# Patient Record
Sex: MALE | Race: WHITE
[De-identification: names, ages, dates, MRNs, and addresses within clinical notes are randomized per-mention and may not be internally consistent; named-entity substitution may affect disease eponyms.]

---

## 2021-06-10 ENCOUNTER — HOSPITAL ENCOUNTER (INPATIENT)
Dept: HOSPITAL 54 - ER | Age: 46
LOS: 4 days | Discharge: NURSING, REHAB OR LONG-TERM CARE | DRG: 871 | End: 2021-06-14
Attending: NURSE PRACTITIONER | Admitting: NURSE PRACTITIONER
Payer: MEDICARE

## 2021-06-10 VITALS — HEIGHT: 70 IN | BODY MASS INDEX: 45.1 KG/M2 | WEIGHT: 315 LBS

## 2021-06-10 VITALS — SYSTOLIC BLOOD PRESSURE: 94 MMHG | DIASTOLIC BLOOD PRESSURE: 51 MMHG

## 2021-06-10 VITALS — SYSTOLIC BLOOD PRESSURE: 104 MMHG | DIASTOLIC BLOOD PRESSURE: 50 MMHG

## 2021-06-10 DIAGNOSIS — S81.811A: ICD-10-CM

## 2021-06-10 DIAGNOSIS — R65.20: ICD-10-CM

## 2021-06-10 DIAGNOSIS — N17.0: ICD-10-CM
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
## 2021-06-10 DIAGNOSIS — I10: ICD-10-CM
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
## 2021-06-10 DIAGNOSIS — A41.9: Primary | ICD-10-CM

## 2021-06-10 DIAGNOSIS — F41.9: ICD-10-CM

## 2021-06-10 DIAGNOSIS — L03.115: ICD-10-CM

## 2021-06-10 DIAGNOSIS — E87.1: ICD-10-CM

## 2021-06-10 DIAGNOSIS — F32.9: ICD-10-CM

## 2021-06-10 DIAGNOSIS — D64.9: ICD-10-CM

## 2021-06-10 DIAGNOSIS — J98.11: ICD-10-CM

## 2021-06-10 DIAGNOSIS — Z20.822: ICD-10-CM

## 2021-06-10 DIAGNOSIS — E87.6: ICD-10-CM

## 2021-06-10 DIAGNOSIS — F20.9: ICD-10-CM

## 2021-06-10 DIAGNOSIS — E66.01: ICD-10-CM

## 2021-06-10 DIAGNOSIS — Z79.899: ICD-10-CM

## 2021-06-10 DIAGNOSIS — G89.29: ICD-10-CM

## 2021-06-10 DIAGNOSIS — E86.1: ICD-10-CM

## 2021-06-10 DIAGNOSIS — E16.2: ICD-10-CM

## 2021-06-10 LAB
BASOPHILS # BLD AUTO: 0.1 /CMM (ref 0–0.2)
BASOPHILS NFR BLD AUTO: 0.4 % (ref 0–2)
BILIRUB DIRECT SERPL-MCNC: 0.2 MG/DL (ref 0–0.2)
BILIRUB SERPL-MCNC: 0.7 MG/DL (ref 0.2–1)
BUN SERPL-MCNC: 12 MG/DL (ref 7–18)
CALCIUM SERPL-MCNC: 9 MG/DL (ref 8.5–10.1)
CHLORIDE SERPL-SCNC: 99 MMOL/L (ref 98–107)
CO2 SERPL-SCNC: 24 MMOL/L (ref 21–32)
COLOR UR: YELLOW
CREAT SERPL-MCNC: 1.2 MG/DL (ref 0.6–1.3)
EOSINOPHIL NFR BLD AUTO: 0 % (ref 0–6)
GLUCOSE SERPL-MCNC: 118 MG/DL (ref 74–106)
HCT VFR BLD AUTO: 47 % (ref 39–51)
HGB BLD-MCNC: 15.2 G/DL (ref 13.5–17.5)
LYMPHOCYTES NFR BLD AUTO: 0.7 /CMM (ref 0.8–4.8)
LYMPHOCYTES NFR BLD AUTO: 4.4 % (ref 20–44)
MCHC RBC AUTO-ENTMCNC: 32 G/DL (ref 31–36)
MCV RBC AUTO: 91 FL (ref 80–96)
MONOCYTES NFR BLD AUTO: 0.5 /CMM (ref 0.1–1.3)
MONOCYTES NFR BLD AUTO: 2.9 % (ref 2–12)
NEUTROPHILS # BLD AUTO: 15.4 /CMM (ref 1.8–8.9)
NEUTROPHILS NFR BLD AUTO: 92.3 % (ref 43–81)
PH UR STRIP: 7.5 [PH] (ref 5–8)
PLATELET # BLD AUTO: 224 /CMM (ref 150–450)
POTASSIUM SERPL-SCNC: 3.8 MMOL/L (ref 3.5–5.1)
RBC # BLD AUTO: 5.16 MIL/UL (ref 4.5–6)
SODIUM SERPL-SCNC: 134 MMOL/L (ref 136–145)
UROBILINOGEN UR STRIP-MCNC: 0.2 EU/DL
WBC NRBC COR # BLD AUTO: 16.7 K/UL (ref 4.3–11)

## 2021-06-10 PROCEDURE — U0003 INFECTIOUS AGENT DETECTION BY NUCLEIC ACID (DNA OR RNA); SEVERE ACUTE RESPIRATORY SYNDROME CORONAVIRUS 2 (SARS-COV-2) (CORONAVIRUS DISEASE [COVID-19]), AMPLIFIED PROBE TECHNIQUE, MAKING USE OF HIGH THROUGHPUT TECHNOLOGIES AS DESCRIBED BY CMS-2020-01-R: HCPCS

## 2021-06-10 PROCEDURE — G0378 HOSPITAL OBSERVATION PER HR: HCPCS

## 2021-06-10 RX ADMIN — BENZTROPINE MESYLATE SCH MG: 1 TABLET ORAL at 19:12

## 2021-06-10 RX ADMIN — DOCUSATE SODIUM SCH MG: 250 CAPSULE, LIQUID FILLED ORAL at 19:13

## 2021-06-10 RX ADMIN — ENOXAPARIN SODIUM SCH MG: 40 INJECTION SUBCUTANEOUS at 19:14

## 2021-06-10 RX ADMIN — ACETAMINOPHEN PRN MG: 325 TABLET ORAL at 21:02

## 2021-06-10 RX ADMIN — PIPERACILLIN SODIUM AND TAZOBACTAM SODIUM SCH MLS/HR: .375; 3 INJECTION, POWDER, LYOPHILIZED, FOR SOLUTION INTRAVENOUS at 21:22

## 2021-06-10 RX ADMIN — TOPIRAMATE SCH MG: 100 TABLET, COATED ORAL at 19:13

## 2021-06-10 RX ADMIN — SODIUM CHLORIDE PRN MLS/HR: 9 INJECTION, SOLUTION INTRAVENOUS at 20:39

## 2021-06-10 NOTE — NUR
MARY FROM [facility] TO ER BED 6. AAOX4. NOT IN RESP DISTRESS. BROUGHT IN 
FOR FEVER .1. PT VERBALIZED THAT HE DIDNT FELL WELL AND FELT WEID WHEN HE 
WAS IN HIS THERAPY SESSION. PT IS NOTED WITH ORAL TEMP .1. MD WAS AT THE 
BEDSIDE FOR EVAL. ORDERS RECEIVED, NOTED AND CARRIED OUT. IV LINE ESTABLISHED 
ON L AC 20G. BLOOD DRAWN AND GIVEN TO PHLEB.

## 2021-06-10 NOTE — NUR
pt transported to unit on [facility] with emt and rn at bedside w/ acls protocol. 
na dnoted during transport. pt ambulated from gurney to bed on steady gait

## 2021-06-10 NOTE — NUR
ms rn note 

 received patient from er with dx rt leg cellulitis under care Moshe guzman rn np , 
patient alert , oriented x4 , on ra no sob noted at this time,vs taken  , lt ac hl intact 
and flushed well , infusion vanco from er at this time,  bed in lowest and locked position , 
body check done ,call light within reach belonging checked by cna , plan of care discussed 
with patient,drown level for lactic acid by phlebotomist, will cont to monitor closely

## 2021-06-11 VITALS — DIASTOLIC BLOOD PRESSURE: 70 MMHG | SYSTOLIC BLOOD PRESSURE: 114 MMHG

## 2021-06-11 VITALS — SYSTOLIC BLOOD PRESSURE: 132 MMHG | DIASTOLIC BLOOD PRESSURE: 81 MMHG

## 2021-06-11 VITALS — DIASTOLIC BLOOD PRESSURE: 80 MMHG | SYSTOLIC BLOOD PRESSURE: 134 MMHG

## 2021-06-11 VITALS — DIASTOLIC BLOOD PRESSURE: 48 MMHG | SYSTOLIC BLOOD PRESSURE: 149 MMHG

## 2021-06-11 LAB
ALBUMIN SERPL BCP-MCNC: 3.3 G/DL (ref 3.4–5)
ALP SERPL-CCNC: 69 U/L (ref 46–116)
ALT SERPL W P-5'-P-CCNC: 45 U/L (ref 12–78)
AST SERPL W P-5'-P-CCNC: 78 U/L (ref 15–37)
BASOPHILS # BLD AUTO: 0 /CMM (ref 0–0.2)
BASOPHILS NFR BLD AUTO: 0.2 % (ref 0–2)
BILIRUB DIRECT SERPL-MCNC: 0.3 MG/DL (ref 0–0.2)
BILIRUB SERPL-MCNC: 1 MG/DL (ref 0.2–1)
BUN SERPL-MCNC: 15 MG/DL (ref 7–18)
CALCIUM SERPL-MCNC: 8.2 MG/DL (ref 8.5–10.1)
CHLORIDE SERPL-SCNC: 103 MMOL/L (ref 98–107)
CHOLEST SERPL-MCNC: 123 MG/DL (ref ?–200)
CO2 SERPL-SCNC: 25 MMOL/L (ref 21–32)
CREAT SERPL-MCNC: 1.5 MG/DL (ref 0.6–1.3)
EOSINOPHIL NFR BLD AUTO: 0 % (ref 0–6)
GLUCOSE SERPL-MCNC: 110 MG/DL (ref 74–106)
HCT VFR BLD AUTO: 41 % (ref 39–51)
HDLC SERPL-MCNC: 45 MG/DL (ref 40–60)
HGB BLD-MCNC: 13.4 G/DL (ref 13.5–17.5)
LDLC SERPL DIRECT ASSAY-MCNC: 66 MG/DL (ref 0–99)
LIPASE SERPL-CCNC: 110 U/L (ref 73–393)
LYMPHOCYTES NFR BLD AUTO: 0.7 /CMM (ref 0.8–4.8)
LYMPHOCYTES NFR BLD AUTO: 4.2 % (ref 20–44)
MAGNESIUM SERPL-MCNC: 2 MG/DL (ref 1.8–2.4)
MCHC RBC AUTO-ENTMCNC: 33 G/DL (ref 31–36)
MCV RBC AUTO: 91 FL (ref 80–96)
MONOCYTES NFR BLD AUTO: 0.6 /CMM (ref 0.1–1.3)
MONOCYTES NFR BLD AUTO: 3.3 % (ref 2–12)
NEUTROPHILS # BLD AUTO: 15.6 /CMM (ref 1.8–8.9)
NEUTROPHILS NFR BLD AUTO: 92.3 % (ref 43–81)
PHOSPHATE SERPL-MCNC: 2.8 MG/DL (ref 2.5–4.9)
PLATELET # BLD AUTO: 186 /CMM (ref 150–450)
POTASSIUM SERPL-SCNC: 3.4 MMOL/L (ref 3.5–5.1)
PROT SERPL-MCNC: 7 G/DL (ref 6.4–8.2)
RBC # BLD AUTO: 4.5 MIL/UL (ref 4.5–6)
SODIUM SERPL-SCNC: 138 MMOL/L (ref 136–145)
TRIGL SERPL-MCNC: 46 MG/DL (ref 30–150)
TSH SERPL DL<=0.005 MIU/L-ACNC: 0.52 UIU/ML (ref 0.36–3.74)
WBC NRBC COR # BLD AUTO: 16.9 K/UL (ref 4.3–11)

## 2021-06-11 RX ADMIN — BENZTROPINE MESYLATE SCH MG: 1 TABLET ORAL at 08:33

## 2021-06-11 RX ADMIN — CLOTRIMAZOLE SCH GM: 1 CREAM TOPICAL at 17:23

## 2021-06-11 RX ADMIN — SODIUM CHLORIDE PRN MLS/HR: 9 INJECTION, SOLUTION INTRAVENOUS at 08:18

## 2021-06-11 RX ADMIN — ENOXAPARIN SODIUM SCH MG: 40 INJECTION SUBCUTANEOUS at 21:56

## 2021-06-11 RX ADMIN — DEXTROSE MONOHYDRATE SCH MLS/HR: 50 INJECTION, SOLUTION INTRAVENOUS at 08:19

## 2021-06-11 RX ADMIN — PIPERACILLIN SODIUM AND TAZOBACTAM SODIUM SCH MLS/HR: .375; 3 INJECTION, POWDER, LYOPHILIZED, FOR SOLUTION INTRAVENOUS at 04:06

## 2021-06-11 RX ADMIN — ACETAMINOPHEN PRN MG: 325 TABLET ORAL at 08:33

## 2021-06-11 RX ADMIN — DOCUSATE SODIUM SCH MG: 250 CAPSULE, LIQUID FILLED ORAL at 17:19

## 2021-06-11 RX ADMIN — ARIPIPRAZOLE SCH MG: 5 TABLET ORAL at 08:33

## 2021-06-11 RX ADMIN — MEROPENEM SCH MLS/HR: 1 INJECTION INTRAVENOUS at 21:54

## 2021-06-11 RX ADMIN — MEROPENEM SCH MLS/HR: 1 INJECTION INTRAVENOUS at 11:51

## 2021-06-11 RX ADMIN — TOPIRAMATE SCH MG: 100 TABLET, COATED ORAL at 17:19

## 2021-06-11 RX ADMIN — TOPIRAMATE SCH MG: 100 TABLET, COATED ORAL at 08:34

## 2021-06-11 RX ADMIN — DEXTROSE MONOHYDRATE SCH MLS/HR: 50 INJECTION, SOLUTION INTRAVENOUS at 20:23

## 2021-06-11 RX ADMIN — BENZTROPINE MESYLATE SCH MG: 1 TABLET ORAL at 17:19

## 2021-06-11 RX ADMIN — CLOTRIMAZOLE SCH GM: 1 CREAM TOPICAL at 09:41

## 2021-06-11 RX ADMIN — LISINOPRIL SCH MG: 10 TABLET ORAL at 08:34

## 2021-06-11 NOTE — NUR
MS/RN OPENING  NOTES

PATIENT IS ON BED AWAKE ALERT AND ORIENTED X3. PATIENT IS ON ROOM AIR SATURATION 97%. 
PATIENT IN NO APPARENT RESPIRATORY DISTRESS NOTED. NO COMPLAINED OF PAIN AT THIS TIME. IV 
ACCESS AT LEFT HAND # 20 G WITH IV FLUID OF NS1L AT 75ML/HR ON AND INFUSING WELL. SEEN AND 
EXAMINED BY MD WITH ORDERS MADE AND CARRIED OUT. ALL DUE MEDICATIONS WAS GIVEN. SAFETY 
PRECAUTIONS WAS IN PLACED. BED IN LOWEST POSITION AND LOCKED. SIDERAILS UP X2. CALL LIGHT 
WITHIN REACH. WILL ENDORSED TO NIGHT SHIFT FOR TIM.

## 2021-06-11 NOTE — NUR
WOUND CARE CONSULT: REVIEWED CHART, NURSING DOCUMENTATION AND PHOTOS WHICH INDICATE 
REDNESS/RASH TO SKIN FOLDS AND DRY WOUND/SCAB TO RT LOWER LEG WITH REDNESS, PRESENT ON 
ADMISSION. RECOMMEND DPM CONSULT. DR HICKS NOTIFIED OF CONSULT REQUEST. RECOMMENDATIONS 
MADE FOR SKIN PROTECTION. DISCUSSED WITH NURSING STAFF. MD IN AGREEMENT WITH PLAN OF CARE. 
CURRENT KVNG SCORE IS 14.

## 2021-06-11 NOTE — NUR
MS/RN OPENING  NOTES

RECEIVED PATIENT IN AWAKE ALERT AND ORIENTED X3. PATIENT IS ON ROOM AIR SATURATING WELL. NO 
COMPLAINED OF PAIN AT THIS TIME. WILL CONTINUE TO MONITOR.

## 2021-06-11 NOTE — NUR
RN notes



In bed. resting comfortably with no distress noted. Breathing even and unlabored. On room 
air tolerating well. Alert and oriented, able to verbally communicate needs. No complaint of 
pain or discomfort. Noted with elevated temp of 100.4. Tylenol given. Temp went down to 
99.2. Kept clean and dry. Will endorse to next shift for continuity of care.

## 2021-06-12 VITALS — DIASTOLIC BLOOD PRESSURE: 75 MMHG | SYSTOLIC BLOOD PRESSURE: 131 MMHG

## 2021-06-12 VITALS — DIASTOLIC BLOOD PRESSURE: 76 MMHG | SYSTOLIC BLOOD PRESSURE: 123 MMHG

## 2021-06-12 LAB
BASOPHILS # BLD AUTO: 0 /CMM (ref 0–0.2)
BASOPHILS NFR BLD AUTO: 0.4 % (ref 0–2)
BUN SERPL-MCNC: 14 MG/DL (ref 7–18)
CALCIUM SERPL-MCNC: 8.2 MG/DL (ref 8.5–10.1)
CHLORIDE SERPL-SCNC: 106 MMOL/L (ref 98–107)
CO2 SERPL-SCNC: 20 MMOL/L (ref 21–32)
CREAT SERPL-MCNC: 1.1 MG/DL (ref 0.6–1.3)
EOSINOPHIL NFR BLD AUTO: 0.2 % (ref 0–6)
GLUCOSE SERPL-MCNC: 92 MG/DL (ref 74–106)
HCT VFR BLD AUTO: 38 % (ref 39–51)
HGB BLD-MCNC: 12.7 G/DL (ref 13.5–17.5)
LYMPHOCYTES NFR BLD AUTO: 1.2 /CMM (ref 0.8–4.8)
LYMPHOCYTES NFR BLD AUTO: 11.7 % (ref 20–44)
MCHC RBC AUTO-ENTMCNC: 33 G/DL (ref 31–36)
MCV RBC AUTO: 90 FL (ref 80–96)
MONOCYTES NFR BLD AUTO: 0.6 /CMM (ref 0.1–1.3)
MONOCYTES NFR BLD AUTO: 6 % (ref 2–12)
NEUTROPHILS # BLD AUTO: 8.5 /CMM (ref 1.8–8.9)
NEUTROPHILS NFR BLD AUTO: 81.7 % (ref 43–81)
PLATELET # BLD AUTO: 175 /CMM (ref 150–450)
POTASSIUM SERPL-SCNC: 3.6 MMOL/L (ref 3.5–5.1)
RBC # BLD AUTO: 4.25 MIL/UL (ref 4.5–6)
SODIUM SERPL-SCNC: 137 MMOL/L (ref 136–145)
WBC NRBC COR # BLD AUTO: 10.4 K/UL (ref 4.3–11)

## 2021-06-12 RX ADMIN — CLOTRIMAZOLE SCH GM: 1 CREAM TOPICAL at 09:03

## 2021-06-12 RX ADMIN — SODIUM CHLORIDE PRN MLS/HR: 9 INJECTION, SOLUTION INTRAVENOUS at 05:09

## 2021-06-12 RX ADMIN — DOCUSATE SODIUM SCH MG: 250 CAPSULE, LIQUID FILLED ORAL at 17:05

## 2021-06-12 RX ADMIN — TOPIRAMATE SCH MG: 100 TABLET, COATED ORAL at 09:03

## 2021-06-12 RX ADMIN — BENZTROPINE MESYLATE SCH MG: 1 TABLET ORAL at 17:06

## 2021-06-12 RX ADMIN — DEXTROSE MONOHYDRATE SCH MLS/HR: 50 INJECTION, SOLUTION INTRAVENOUS at 20:11

## 2021-06-12 RX ADMIN — BENZTROPINE MESYLATE SCH MG: 1 TABLET ORAL at 09:03

## 2021-06-12 RX ADMIN — MEROPENEM SCH MLS/HR: 1 INJECTION INTRAVENOUS at 12:14

## 2021-06-12 RX ADMIN — ARIPIPRAZOLE SCH MG: 5 TABLET ORAL at 10:09

## 2021-06-12 RX ADMIN — TOPIRAMATE SCH MG: 100 TABLET, COATED ORAL at 17:06

## 2021-06-12 RX ADMIN — DEXTROSE MONOHYDRATE SCH MLS/HR: 50 INJECTION, SOLUTION INTRAVENOUS at 09:03

## 2021-06-12 RX ADMIN — LISINOPRIL SCH MG: 10 TABLET ORAL at 09:03

## 2021-06-12 RX ADMIN — MEROPENEM SCH MLS/HR: 1 INJECTION INTRAVENOUS at 05:04

## 2021-06-12 RX ADMIN — CLOTRIMAZOLE SCH GM: 1 CREAM TOPICAL at 17:06

## 2021-06-12 RX ADMIN — ENOXAPARIN SODIUM SCH MG: 40 INJECTION SUBCUTANEOUS at 21:00

## 2021-06-12 RX ADMIN — MEROPENEM SCH MLS/HR: 1 INJECTION INTRAVENOUS at 22:08

## 2021-06-12 NOTE — NUR
RN notes



No distress noted, in bed. resting comfortably. Breathing even and unlabored. On room air 
tolerating well. Alert and oriented, able to verbally communicate needs. No complaint of 
pain or discomfort. Noted with elevated temp of 102.8. Tylenol given. Temp went down to 
99.9. Kept clean and dry. Will endorse to next shift for continuity of care.

## 2021-06-12 NOTE — NUR
MSRN OPENING notes



PT RECIEVED IN BED No distress noted,  resting comfortably. Breathing even and unlabored. On 
room air tolerating well. Alert and oriented, able to verbally communicate needs. No 
complaint of pain or discomfort. Had fever of 102.8 at night , tylenol given at night , 
recent temp was 99.8 Kept clean and dry. CALL LIGHT WITHIN EASY REACH AND ANSWERED PROPMTLY.

## 2021-06-12 NOTE — NUR
SRN CLOSING notes



PT RECIEVED IN BED No distress noted,  resting comfortably. Breathing even and unlabored. On 
room air tolerating well. Alert and oriented, able to verbally communicate needs. No 
complaint of pain or discomfort.  Kept clean and dry. CALL LIGHT WITHIN EASY REACH AND 
ANSWERED PROPMTLY.

## 2021-06-13 VITALS — DIASTOLIC BLOOD PRESSURE: 74 MMHG | SYSTOLIC BLOOD PRESSURE: 149 MMHG

## 2021-06-13 VITALS — SYSTOLIC BLOOD PRESSURE: 103 MMHG | DIASTOLIC BLOOD PRESSURE: 54 MMHG

## 2021-06-13 VITALS — SYSTOLIC BLOOD PRESSURE: 103 MMHG | DIASTOLIC BLOOD PRESSURE: 60 MMHG

## 2021-06-13 LAB
BUN SERPL-MCNC: 13 MG/DL (ref 7–18)
CALCIUM SERPL-MCNC: 8.1 MG/DL (ref 8.5–10.1)
CHLORIDE SERPL-SCNC: 107 MMOL/L (ref 98–107)
CO2 SERPL-SCNC: 21 MMOL/L (ref 21–32)
CREAT SERPL-MCNC: 1.1 MG/DL (ref 0.6–1.3)
GLUCOSE SERPL-MCNC: 95 MG/DL (ref 74–106)
POTASSIUM SERPL-SCNC: 3.3 MMOL/L (ref 3.5–5.1)
SODIUM SERPL-SCNC: 138 MMOL/L (ref 136–145)

## 2021-06-13 RX ADMIN — CLOTRIMAZOLE SCH GM: 1 CREAM TOPICAL at 16:41

## 2021-06-13 RX ADMIN — BENZTROPINE MESYLATE SCH MG: 1 TABLET ORAL at 07:58

## 2021-06-13 RX ADMIN — DOCUSATE SODIUM SCH MG: 250 CAPSULE, LIQUID FILLED ORAL at 16:34

## 2021-06-13 RX ADMIN — TOPIRAMATE SCH MG: 100 TABLET, COATED ORAL at 07:58

## 2021-06-13 RX ADMIN — ENOXAPARIN SODIUM SCH MG: 40 INJECTION SUBCUTANEOUS at 20:57

## 2021-06-13 RX ADMIN — DEXTROSE MONOHYDRATE SCH MLS/HR: 50 INJECTION, SOLUTION INTRAVENOUS at 07:57

## 2021-06-13 RX ADMIN — LISINOPRIL SCH MG: 10 TABLET ORAL at 07:58

## 2021-06-13 RX ADMIN — BENZTROPINE MESYLATE SCH MG: 1 TABLET ORAL at 16:28

## 2021-06-13 RX ADMIN — ENOXAPARIN SODIUM SCH MG: 40 INJECTION SUBCUTANEOUS at 21:34

## 2021-06-13 RX ADMIN — DEXTROSE MONOHYDRATE SCH MLS/HR: 50 INJECTION, SOLUTION INTRAVENOUS at 20:04

## 2021-06-13 RX ADMIN — ARIPIPRAZOLE SCH MG: 5 TABLET ORAL at 07:57

## 2021-06-13 RX ADMIN — SODIUM CHLORIDE PRN MLS/HR: 9 INJECTION, SOLUTION INTRAVENOUS at 04:13

## 2021-06-13 RX ADMIN — MEROPENEM SCH MLS/HR: 1 INJECTION INTRAVENOUS at 04:12

## 2021-06-13 RX ADMIN — TOPIRAMATE SCH MG: 100 TABLET, COATED ORAL at 16:33

## 2021-06-13 RX ADMIN — CLOTRIMAZOLE SCH GM: 1 CREAM TOPICAL at 07:59

## 2021-06-13 RX ADMIN — MEROPENEM SCH MLS/HR: 1 INJECTION INTRAVENOUS at 20:58

## 2021-06-13 RX ADMIN — MEROPENEM SCH MLS/HR: 1 INJECTION INTRAVENOUS at 11:56

## 2021-06-13 NOTE — NUR
MS1 RN NOTES

OFFER TO GIVE DUE LOVENOX 40MG SQ BUT REFUSED.EXPLAINED RISK AND BENEFITS,STILL REFUSED

## 2021-06-13 NOTE — NUR
MS RN CLOSING NOTES: PATIENT RESTING IN BED, AWAKE, A/O X4. NO S/S OF DISTRESS NOTED. CALL 
LIGHT WITHIN REACH. BED ALARM ON. BED IN LOWEST AND LOCKED POSITION. NO COMPLAIN OF PAIN.

## 2021-06-13 NOTE — NUR
MS1 RN NOTES

RECEIVED LYING ON BED,A/O X 3,NO SOB,NOTED REDNESS WITH SWELLING ON RIGHT LOWER 
LEG,ENCOURAGED TO ELEVATE ON PILLOWS WHILE ON BED,AMBULATE  WITH WALKER.PRESENT IVF INFUSING 
WELL ON RIGHT HAND SALINE LOCK VIA IV PUMP.DENIES PAIN AT THE MOMENT.CALL LIGHT IN 
REACH,NEEDS ANTICIPATED.

## 2021-06-13 NOTE — NUR
alert, oriented, claimed he cant walk to the bathroom, urinal offered.  R lower leg with 
noticeable redness, swollen and warm to touch. 

K+ today 3.3, got replaced with 40meq po.

continues on 2 abx ivpb ( Meropenem, and Vanco).  No complaint of pain on the leg

## 2021-06-14 VITALS — SYSTOLIC BLOOD PRESSURE: 120 MMHG | DIASTOLIC BLOOD PRESSURE: 72 MMHG

## 2021-06-14 VITALS — DIASTOLIC BLOOD PRESSURE: 74 MMHG | SYSTOLIC BLOOD PRESSURE: 120 MMHG

## 2021-06-14 LAB
ALBUMIN SERPL BCP-MCNC: 3.1 G/DL (ref 3.4–5)
ALP SERPL-CCNC: 57 U/L (ref 46–116)
ALT SERPL W P-5'-P-CCNC: 70 U/L (ref 12–78)
AST SERPL W P-5'-P-CCNC: 111 U/L (ref 15–37)
BASOPHILS # BLD AUTO: 0.1 /CMM (ref 0–0.2)
BASOPHILS NFR BLD AUTO: 0.9 % (ref 0–2)
BILIRUB SERPL-MCNC: 0.6 MG/DL (ref 0.2–1)
BUN SERPL-MCNC: 10 MG/DL (ref 7–18)
CALCIUM SERPL-MCNC: 8.3 MG/DL (ref 8.5–10.1)
CHLORIDE SERPL-SCNC: 109 MMOL/L (ref 98–107)
CO2 SERPL-SCNC: 19 MMOL/L (ref 21–32)
CREAT SERPL-MCNC: 0.9 MG/DL (ref 0.6–1.3)
EOSINOPHIL NFR BLD AUTO: 3.7 % (ref 0–6)
GLUCOSE SERPL-MCNC: 92 MG/DL (ref 74–106)
HCT VFR BLD AUTO: 39 % (ref 39–51)
HGB BLD-MCNC: 12.9 G/DL (ref 13.5–17.5)
LYMPHOCYTES NFR BLD AUTO: 1.4 /CMM (ref 0.8–4.8)
LYMPHOCYTES NFR BLD AUTO: 21 % (ref 20–44)
MAGNESIUM SERPL-MCNC: 2.2 MG/DL (ref 1.8–2.4)
MCHC RBC AUTO-ENTMCNC: 33 G/DL (ref 31–36)
MCV RBC AUTO: 90 FL (ref 80–96)
MONOCYTES NFR BLD AUTO: 0.5 /CMM (ref 0.1–1.3)
MONOCYTES NFR BLD AUTO: 8.2 % (ref 2–12)
NEUTROPHILS # BLD AUTO: 4.3 /CMM (ref 1.8–8.9)
NEUTROPHILS NFR BLD AUTO: 66.2 % (ref 43–81)
PHOSPHATE SERPL-MCNC: 2.3 MG/DL (ref 2.5–4.9)
PLATELET # BLD AUTO: 199 /CMM (ref 150–450)
POTASSIUM SERPL-SCNC: 3.7 MMOL/L (ref 3.5–5.1)
PROT SERPL-MCNC: 7.1 G/DL (ref 6.4–8.2)
RBC # BLD AUTO: 4.35 MIL/UL (ref 4.5–6)
SODIUM SERPL-SCNC: 140 MMOL/L (ref 136–145)
WBC NRBC COR # BLD AUTO: 6.4 K/UL (ref 4.3–11)

## 2021-06-14 RX ADMIN — MEROPENEM SCH MLS/HR: 1 INJECTION INTRAVENOUS at 12:21

## 2021-06-14 RX ADMIN — CLOTRIMAZOLE SCH GM: 1 CREAM TOPICAL at 08:12

## 2021-06-14 RX ADMIN — LISINOPRIL SCH MG: 10 TABLET ORAL at 08:11

## 2021-06-14 RX ADMIN — MEROPENEM SCH MLS/HR: 1 INJECTION INTRAVENOUS at 04:47

## 2021-06-14 RX ADMIN — DEXTROSE MONOHYDRATE SCH MLS/HR: 50 INJECTION, SOLUTION INTRAVENOUS at 07:57

## 2021-06-14 RX ADMIN — BENZTROPINE MESYLATE SCH MG: 1 TABLET ORAL at 08:11

## 2021-06-14 RX ADMIN — TOPIRAMATE SCH MG: 100 TABLET, COATED ORAL at 08:10

## 2021-06-14 RX ADMIN — ARIPIPRAZOLE SCH MG: 5 TABLET ORAL at 08:10

## 2021-06-14 NOTE — NUR
RN OPENING NOTES

PATIENT PRESENT IN BED, AWAKE, A/OX3, ON ROOM AIR, SATURATING WELL, SPO2 98%, DENIES PAIN, 
DISCOMFORT, NO SOB NOTED, IV ON LEFT HAND PATENT, INTACT, FLUSHES WELL, SAFETY MEASURES IN 
PLACE, BED LOCKED, IN LOWEST POSITION, HOB ELEVATED, CALL LIGHT IN REACH, WILL CONT TO 
MONITOR